# Patient Record
Sex: FEMALE
[De-identification: names, ages, dates, MRNs, and addresses within clinical notes are randomized per-mention and may not be internally consistent; named-entity substitution may affect disease eponyms.]

---

## 2021-08-03 ENCOUNTER — RESULT REVIEW (OUTPATIENT)
Age: 41
End: 2021-08-03

## 2021-09-16 PROBLEM — Z00.00 ENCOUNTER FOR PREVENTIVE HEALTH EXAMINATION: Status: ACTIVE | Noted: 2021-09-16

## 2021-09-29 ENCOUNTER — APPOINTMENT (OUTPATIENT)
Dept: ULTRASOUND IMAGING | Facility: CLINIC | Age: 41
End: 2021-09-29
Payer: COMMERCIAL

## 2021-09-29 ENCOUNTER — OUTPATIENT (OUTPATIENT)
Dept: OUTPATIENT SERVICES | Facility: HOSPITAL | Age: 41
LOS: 1 days | End: 2021-09-29

## 2021-09-29 PROCEDURE — 76830 TRANSVAGINAL US NON-OB: CPT | Mod: 26

## 2023-05-09 ENCOUNTER — APPOINTMENT (OUTPATIENT)
Dept: MAMMOGRAPHY | Facility: CLINIC | Age: 43
End: 2023-05-09
Payer: COMMERCIAL

## 2023-05-09 ENCOUNTER — OUTPATIENT (OUTPATIENT)
Dept: OUTPATIENT SERVICES | Facility: HOSPITAL | Age: 43
LOS: 1 days | End: 2023-05-09

## 2023-05-09 PROCEDURE — 77067 SCR MAMMO BI INCL CAD: CPT | Mod: 26

## 2023-05-09 PROCEDURE — 77063 BREAST TOMOSYNTHESIS BI: CPT | Mod: 26

## 2024-03-31 ENCOUNTER — NON-APPOINTMENT (OUTPATIENT)
Age: 44
End: 2024-03-31

## 2024-04-01 ENCOUNTER — APPOINTMENT (OUTPATIENT)
Dept: ORTHOPEDIC SURGERY | Facility: CLINIC | Age: 44
End: 2024-04-01
Payer: COMMERCIAL

## 2024-04-01 VITALS — HEIGHT: 67 IN | BODY MASS INDEX: 21.19 KG/M2 | WEIGHT: 135 LBS

## 2024-04-01 DIAGNOSIS — S73.199A OTHER SPRAIN OF UNSPECIFIED HIP, INITIAL ENCOUNTER: ICD-10-CM

## 2024-04-01 PROCEDURE — 99203 OFFICE O/P NEW LOW 30 MIN: CPT

## 2024-04-01 RX ORDER — METHYLPREDNISOLONE 4 MG/1
4 TABLET ORAL
Qty: 1 | Refills: 1 | Status: ACTIVE | COMMUNITY
Start: 2024-04-01 | End: 1900-01-01

## 2024-04-01 NOTE — DISCUSSION/SUMMARY
[de-identified] : Talked at length with the patient about the findings of her MRI of the left hip she has a nondisplaced tear I do not believe it is the source of her discomfort.  I told her that her clinical exam and history are all more consistent with this diagnosis of left-sided lumbar radiculopathy.  We reviewed the pertinent anatomy lumbar spine patient is able to appreciate how exiting nerve root pressure on the left side lumbar spine close symptoms very similar to her complaints.  That and we talked about her options I will place patient on a Medrol Dosepak the reason risk benefits of medication were discussed in detail including potential side effects.  We reviewed her current medication profile and appears to be no relative contraindication to his current limited use.  Patient will be sent for an MRI of the lumbar spine if she does not see sustained symptomatic improvement with these conservative measures.  Today's consultation lasted 35 minutes.

## 2024-04-01 NOTE — REASON FOR VISIT
[Initial Visit] : an initial visit for [Hip Pain] : hip pain [FreeTextEntry2] : LEFT HIP PAIN. SHE HAS BEEN SUFFERING FOR 5+ YEARS. SHE HAS DONE PT AND RADIOGRAPHS IN THE PAST. NO INJURY/TRAUMA.

## 2024-04-01 NOTE — PHYSICAL EXAM
[de-identified] : Right hip on exam has full passive internal/external rotation held at 90 degrees with no mechanical block restriction or pain.  She is neurovascular intact distally she does have some mild paraspinal lumbar tenderness to palpation of left-sided paraspinal lumbar musculature negative straight leg raising test neurovascular intact distally.  No significant quadriceps atrophy of the left.

## 2024-04-01 NOTE — HISTORY OF PRESENT ILLNESS
[de-identified] : First-time visit for this 43-year-old female she is here with approximately 5 years duration of left-sided low back and hip and thigh pain.  Patient recalls no specific accident or injury she was told in the past that she had a labral tear she presents an MRI finding performed recently which indicates a nondisplaced tear of the labrum of the left hip.  No evidence of any arthritis or other abnormalities of the hip.  No evidence of cam type impingement.  Patient is here to be evaluated she continues to have intermittent discomfort.  She has not seen any improvement with physical therapy although she does admit that she was not fairly consistent with physical therapy prescribed by another physician.

## 2024-04-02 ENCOUNTER — APPOINTMENT (OUTPATIENT)
Dept: ULTRASOUND IMAGING | Facility: CLINIC | Age: 44
End: 2024-04-02
Payer: COMMERCIAL

## 2024-04-02 ENCOUNTER — OUTPATIENT (OUTPATIENT)
Dept: OUTPATIENT SERVICES | Facility: HOSPITAL | Age: 44
LOS: 1 days | End: 2024-04-02

## 2024-04-02 ENCOUNTER — APPOINTMENT (OUTPATIENT)
Dept: MRI IMAGING | Facility: CLINIC | Age: 44
End: 2024-04-02
Payer: COMMERCIAL

## 2024-04-02 PROCEDURE — 70551 MRI BRAIN STEM W/O DYE: CPT | Mod: 26

## 2024-04-02 PROCEDURE — 76536 US EXAM OF HEAD AND NECK: CPT | Mod: 26

## 2024-04-12 ENCOUNTER — APPOINTMENT (OUTPATIENT)
Dept: MRI IMAGING | Facility: CLINIC | Age: 44
End: 2024-04-12
Payer: COMMERCIAL

## 2024-04-12 ENCOUNTER — OUTPATIENT (OUTPATIENT)
Dept: OUTPATIENT SERVICES | Facility: HOSPITAL | Age: 44
LOS: 1 days | End: 2024-04-12

## 2024-04-12 PROCEDURE — 72148 MRI LUMBAR SPINE W/O DYE: CPT | Mod: 26

## 2024-04-19 ENCOUNTER — APPOINTMENT (OUTPATIENT)
Dept: PHYSICAL MEDICINE AND REHAB | Facility: CLINIC | Age: 44
End: 2024-04-19
Payer: COMMERCIAL

## 2024-04-19 ENCOUNTER — TRANSCRIPTION ENCOUNTER (OUTPATIENT)
Age: 44
End: 2024-04-19

## 2024-04-19 DIAGNOSIS — M51.26 OTHER INTERVERTEBRAL DISC DISPLACEMENT, LUMBAR REGION: ICD-10-CM

## 2024-04-19 DIAGNOSIS — M54.16 RADICULOPATHY, LUMBAR REGION: ICD-10-CM

## 2024-04-19 DIAGNOSIS — M76.892 OTHER SPECIFIED ENTHESOPATHIES OF LEFT LOWER LIMB, EXCLUDING FOOT: ICD-10-CM

## 2024-04-19 PROCEDURE — 99204 OFFICE O/P NEW MOD 45 MIN: CPT

## 2024-04-25 ENCOUNTER — APPOINTMENT (OUTPATIENT)
Dept: HEMATOLOGY ONCOLOGY | Facility: CLINIC | Age: 44
End: 2024-04-25

## 2024-04-29 ENCOUNTER — APPOINTMENT (OUTPATIENT)
Dept: OTOLARYNGOLOGY | Facility: CLINIC | Age: 44
End: 2024-04-29
Payer: COMMERCIAL

## 2024-04-29 VITALS
SYSTOLIC BLOOD PRESSURE: 113 MMHG | RESPIRATION RATE: 17 BRPM | HEART RATE: 74 BPM | DIASTOLIC BLOOD PRESSURE: 71 MMHG | HEIGHT: 67 IN | TEMPERATURE: 98.1 F | OXYGEN SATURATION: 98 % | BODY MASS INDEX: 21.19 KG/M2 | WEIGHT: 135 LBS

## 2024-04-29 DIAGNOSIS — R09.81 NASAL CONGESTION: ICD-10-CM

## 2024-04-29 DIAGNOSIS — J34.89 OTHER SPECIFIED DISORDERS OF NOSE AND NASAL SINUSES: ICD-10-CM

## 2024-04-29 PROCEDURE — 99204 OFFICE O/P NEW MOD 45 MIN: CPT

## 2024-04-29 RX ORDER — FLUTICASONE PROPIONATE 50 UG/1
50 SPRAY, METERED NASAL TWICE DAILY
Qty: 1 | Refills: 3 | Status: ACTIVE | COMMUNITY
Start: 2024-04-29 | End: 1900-01-01

## 2024-04-29 RX ORDER — MUPIROCIN 20 MG/G
2 OINTMENT TOPICAL 3 TIMES DAILY
Qty: 1 | Refills: 1 | Status: ACTIVE | COMMUNITY
Start: 2024-04-29 | End: 1900-01-01

## 2024-04-29 NOTE — PHYSICAL EXAM
[TextEntry] : GEN: well nourished, well developed, no acute distress. VOICE: normal tone, quality, and volume, no hoarseness. HEAD: normocephalic, atraumatic, no TMJ pain or jaw clicking FACE: symmetric and motion intact, Kelley 4 EYES: EOMI, pupils symmetric, normal conjunctiva, vision grossly intact EARS: bilateral auricles normal, TMs intact & well-aerated, hearing grossly intact EXT NOSE:  moderately thick skin, no significant external deviation  INT NOSE: Mucosa healthy w/ some area of excoriation along right vestibular area, septum intact w/ leftward deviation, patent internal nasal valve, moderate turbinate hypertrophy  ORAL CAVITY: normal dentition with baseline occlusion, mucus membranes healthy SKIN: intact, warm, and dry NEURO: alert & oriented x4

## 2024-04-29 NOTE — ASSESSMENT
[FreeTextEntry1] : .  Plan: - Recommend mupirocin ointment TID to vestibular area  - Recommend Flonase nasal spray trial and Breathe-right strip trial for nasal obstruction - RTC 1 month  -- Madeline Silver MD Facial Plastic & Reconstructive Surgery

## 2024-04-29 NOTE — HISTORY OF PRESENT ILLNESS
Mother of patient (Brie) is calling to request a Updated Referral to same office of Dermatology but to the Attn of:  Ben Nunez   Fax: [phone]   [de-identified] : Ms. UZAIR HYLTON is a pleasant 43 year female presenting today as self-referral for nasal pain.   Pt reports that for the last 6 months she has noted soreness and dryness of her nose. She localizes pain the the anterior right vestibule area near the caudal margin of the LLC. She has not tried anything for it yet. Never experienced anything like this. She did report she had some scant blood when she was cleaning her nose recently. Pt does endorse some allergies at time. She reports chronic nasal obstruction. She has not tried any medications for this before either. Never underwent nasal surgery or sustained nasal trauma in the past.   Past medical/surgical history is unremarkable. Nonsmoker. Not on anticoagulation. Here by herself.   JOSE ALEJANDROS-C 16/30; WYATT-F 9/20

## 2024-05-02 NOTE — DISCUSSION/SUMMARY
[FreeTextEntry1] : REASON FOR CONSULT: Lara Cash is a 43-year-old female referred by Dr. Gissel Tovar for cancer genetic counseling and risk assessment due to a family history of cancer. Ms. Cash was seen on 2024 at which time medical and family history was ascertained and a pedigree constructed. Genetic counseling , Kayley Babin, also participated in this session.  RELEVANT MEDICAL HISTORY: Ms. Cash is a healthy individual with no reported history of cancer. She has a family history of cancer, see below.  OTHER MEDICAL AND SURGICAL HISTORY: -	Medical History: no significant medical history reported -	Surgical History: left breast excision (fibroadenoma)  OB/GYN HISTORY: Obstetrical History:  Age at Menarche: 12 Menopausal Status: Premenopausal  Age at First Live Birth: N/A Oral Contraceptive Use: No, starting soon as per recommendations from GYN d/t family history of ovarian cancer Hormone Replacement Therapy: No  CANCER SCREENING HISTORY:   Breast:  -	Mammography: 2023- wnl -	Sonography: No -	MRI: No -	Biopsies: left breast mass 10-15 years ago, reportedly identified as fibroadenoma GYN: -	Pelvic Examination: Annual, last 24- wnl, history of fibroids Colon: -	Colonoscopy: - d/t digestive symptoms, reported diverticulosis, no polyps, repeat in 7 years Skin:   -	FBSE: Yes, annual -	Lesions biopsied/removed: no   SOCIAL HISTORY: -	Tobacco-product use: No -	Environmental exposures: No   FAMILY HISTORY: Maternal and paternal ancestry was reported as . Ashkenazi Christianity ancestry was denied. A detailed family history of cancer was ascertained, see below and scanned chart for pedigree.  	 Of note, Ms. Cash reported a maternal aunt (unaffected) underwent genetic testing within the past 3 years with negative results however a copy of her report was not available for review.   According to Ms. Cash no one else in the family has had germline testing for cancer susceptibility. Consanguinity was denied.  	 RISK ASSESSMENT: Ms. Cash's personal and family history is suggestive of a hereditary cancer syndrome given her maternal aunt's history of ovarian cancer. The patient meets National Comprehensive Cancer Network (NCCN) criteria for genetic testing. We recommended genetic testing for genes associated with breast and gynecological cancer. This test analyzes 19 genes through [facility]: QI, BARD1, BRCA1, BRCA2, BRIP1, CDH1, CHEK2, EPCAM, MLH1, MSH2, MSH6, NF1, PALB2, PMS2, PTEN, RAD51C, RAD51D, STK11, and TP53.  The risks, benefits and limitations of genetic testing were discussed with Ms. Cash. In addition, we discussed the purpose of genetic testing and possible test results (positive, negative, inconclusive) along with associated medical management options and psychosocial implications. Insurance coverage and potential out of pocket costs were also discussed. The Genetic Information Non-discrimination Act (BEATA) was reviewed.  It was explained that risk assessment is based upon medical and family history as provided and may change in the future should new information be obtained.   Following our discussion, Ms. Cash consented to the above-mentioned genetic testing panel. Blood was drawn in our laboratory and sent to [facility] today.  PLAN:  1.	Blood drawn today will be sent to [facility] for analysis.  2.	We will contact Ms. Cash to schedule a follow-up appointment once the results are available. Results generally return in 2-3 weeks.   For any additional questions please call Cancer Genetics at [phone].    Iveth Riley MS, [facility] [place], Cancer Genetics

## 2024-05-06 ENCOUNTER — APPOINTMENT (OUTPATIENT)
Dept: OTOLARYNGOLOGY | Facility: CLINIC | Age: 44
End: 2024-05-06

## 2024-05-22 ENCOUNTER — NON-APPOINTMENT (OUTPATIENT)
Age: 44
End: 2024-05-22

## 2024-05-22 RX ORDER — MELOXICAM 15 MG/1
15 TABLET ORAL DAILY
Qty: 28 | Refills: 0 | Status: ACTIVE | COMMUNITY
Start: 2024-04-19 | End: 1900-01-01

## 2024-05-22 RX ORDER — NORETHINDRONE ACETATE AND ETHINYL ESTRADIOL AND FERROUS FUMARATE 1MG-20(21)
KIT ORAL
Refills: 0 | Status: ACTIVE | COMMUNITY

## 2024-05-22 NOTE — DISCUSSION/SUMMARY
[FreeTextEntry1] : RESULTS TRANSMISSION:   Lara Cash is a 43-year-old female who was contacted on May 22, 2024 for a discussion regarding her negative genetic testing results related to hereditary cancer predisposition. This session was conducted via telephone.   Ms. Cash was originally seen by the Cancer Genetics Service on April 25, 2024 for hereditary cancer predisposition risk assessment due to a family history of ovarian cancer. At that time, Ms. Cash decided to pursue genetic testing for genes associated with breast and gynecological cancers offered by [facility].  TEST RESULTS: NEGATIVE NO pathogenic (disease-causing) variants or variants of uncertain significance were detected in any of the following genes [19]:  QI, BARD1, BRCA1, BRCA2, BRIP1, CDH1, CHEK2, EPCAM, MLH1, MSH2, MSH6, NF1, PALB2, PMS2, PTEN, RAD51C, RAD51D, STK11, and TP53.  RESULTS INTERPRETATION AND ASSESSMENT: Given Ms. Cash's personal and current reported family history of cancer, and her negative genetic test results, the following screening guidelines and risk-reducing recommendations were discussed:  OTHER: -	In the absence of other indications, Ms. Cash should practice age-appropriate cancer screening of other organ systems as recommended for the general population.  We also discussed that, while no cause of the patient's personal and family history of cancer was identified, this result, while reassuring, does entirely not rule out a hereditary cancer risk in the patient. It is possible, although unlikely, the patient has a mutation in one of the genes tested that is not detectable by this analysis, or has a mutation in a different gene, either known or unknown. It is also possible there is a hereditary cancer predisposition in the family, but the patient did not inherit it.   We informed Ms. Cash that our knowledge of genetics and inherited cancer conditions is changing rapidly. Therefore, we recommended that Ms. Cash contact our office, every 2 to 3 years, to discuss relevant advances in cancer genetics.  We emphasized the importance of re-contacting us with updates regarding her personal and family history of cancer as well as any updates regarding additional cancer genetic test results performed for the patient and/or family members.  Such updates could possibly change our risk assessment and recommendations.   PLAN: 1.	These results do not change Ms. Cash's medical management.  2.	Patient informed consult note(s) will be available through their [facility] patient portal and genetic test results will be released via [facility]'s portal. 3.	Ms. Cash was encouraged to contact us every 2-3 years to discuss relevant advances in cancer genetics, or sooner if there are any changes in her personal or family history of cancer.   For any additional questions please call Cancer Genetics at [phone].    Iveth Riley MS, [facility] [place] Genetic Counselor, Cancer Genetics

## 2025-05-02 ENCOUNTER — OUTPATIENT (OUTPATIENT)
Dept: OUTPATIENT SERVICES | Facility: HOSPITAL | Age: 45
LOS: 1 days | End: 2025-05-02

## 2025-05-02 ENCOUNTER — APPOINTMENT (OUTPATIENT)
Dept: ULTRASOUND IMAGING | Facility: CLINIC | Age: 45
End: 2025-05-02
Payer: COMMERCIAL

## 2025-05-02 ENCOUNTER — APPOINTMENT (OUTPATIENT)
Dept: MAMMOGRAPHY | Facility: CLINIC | Age: 45
End: 2025-05-02
Payer: COMMERCIAL

## 2025-05-02 PROCEDURE — 77067 SCR MAMMO BI INCL CAD: CPT | Mod: 26

## 2025-05-02 PROCEDURE — 77063 BREAST TOMOSYNTHESIS BI: CPT | Mod: 26

## 2025-05-02 PROCEDURE — 76856 US EXAM PELVIC COMPLETE: CPT | Mod: 26
